# Patient Record
Sex: FEMALE | Race: WHITE | URBAN - METROPOLITAN AREA
[De-identification: names, ages, dates, MRNs, and addresses within clinical notes are randomized per-mention and may not be internally consistent; named-entity substitution may affect disease eponyms.]

---

## 2019-04-11 ENCOUNTER — HOSPITAL ENCOUNTER (EMERGENCY)
Facility: CLINIC | Age: 49
Discharge: HOME | End: 2019-04-11
Attending: FAMILY MEDICINE | Admitting: FAMILY MEDICINE
Payer: COMMERCIAL

## 2019-04-11 VITALS
HEIGHT: 72 IN | HEART RATE: 70 BPM | TEMPERATURE: 98.4 F | WEIGHT: 205 LBS | SYSTOLIC BLOOD PRESSURE: 133 MMHG | DIASTOLIC BLOOD PRESSURE: 93 MMHG | OXYGEN SATURATION: 95 % | RESPIRATION RATE: 16 BRPM | BODY MASS INDEX: 27.77 KG/M2

## 2019-04-11 DIAGNOSIS — M70.52 INFRAPATELLAR BURSITIS OF LEFT KNEE: ICD-10-CM

## 2019-04-11 PROCEDURE — 99282 EMERGENCY DEPT VISIT SF MDM: CPT | Performed by: FAMILY MEDICINE

## 2019-04-11 PROCEDURE — 99283 EMERGENCY DEPT VISIT LOW MDM: CPT | Mod: Z6 | Performed by: FAMILY MEDICINE

## 2019-04-11 ASSESSMENT — ENCOUNTER SYMPTOMS
ABDOMINAL PAIN: 0
FREQUENCY: 0
SORE THROAT: 0
DIAPHORESIS: 0
CONSTIPATION: 0
SHORTNESS OF BREATH: 0
COUGH: 0
DIARRHEA: 0
FEVER: 0
HEADACHES: 0
WHEEZING: 0
DYSURIA: 0
VOMITING: 0
BLOOD IN STOOL: 0
NAUSEA: 0
CHILLS: 0
PALPITATIONS: 0
SINUS PRESSURE: 0

## 2019-04-11 ASSESSMENT — MIFFLIN-ST. JEOR: SCORE: 1661.87

## 2019-04-11 NOTE — DISCHARGE INSTRUCTIONS
ICD-10-CM    1. Infrapatellar bursitis of left knee M70.52     pretibial bursitis vs sebaceous cyst.,  cool compress to the area 20 min/hour for the next 2-3 days. ace wrap.  return immed for signs infection.  follow-up next week. avoid kneeling or trauma to area

## 2019-04-11 NOTE — ED AVS SNAPSHOT
[facility] Emergency Department  [address] [address]  Phone:  [phone]  Fax:  [phone]                                    Mercedes Dailey   MRN: [id number]    Department:  [facility] Emergency Department   Date of Visit:  4/11/2019           After Visit Summary Signature Page    I have received my discharge instructions, and my questions have been answered. I have discussed any challenges I see with this plan with the nurse or doctor.    ..........................................................................................................................................  Patient/Patient Representative Signature      ..........................................................................................................................................  Patient Representative Print Name and Relationship to Patient    ..................................................               ................................................  Date                                   Time    ..........................................................................................................................................  Reviewed by Signature/Title    ...................................................              ..............................................  Date                                               Time          22EPIC Rev 08/18

## 2019-04-11 NOTE — ED PROVIDER NOTES
History     Chief Complaint   Patient presents with     Mass     left leg     HPI  Mercedes Dailey is a 49 year old female who presents with a history of impaired fasting glucose, hypertension, tobacco abuse with acute onset of left leg focal swelling at the tibial tuberosity region without trauma but may have been kneeling today picking up laundry.  No overuse or direct blow to the knee.  No significant pain.  No recent fever or erythema or signs of infection.    Allergies:  Allergies   Allergen Reactions     Erythromycin Other (See Comments)     Vomiting     Penicillin G Hives       Problem List:    Patient Active Problem List    Diagnosis Date Noted     Abnormal glandular Papanicolaou smear of cervix 03/22/2016     Priority: Medium     Overview:   03/22/2016; Atypical Endo Cells: Colposcopy and Endocervial sampling advised       Tobacco dependence syndrome 03/14/2014     Priority: Medium     Hypertension 01/15/2010     Priority: Medium     Impaired fasting glucose 08/21/2009     Priority: Medium     Multiple-type hyperlipidemia 08/21/2009     Priority: Medium     Hereditary and idiopathic peripheral neuropathy 08/14/2009     Priority: Medium     Major depressive episode 05/17/2008     Priority: Medium     Anxiety state 05/02/2007     Priority: Medium     Panic disorder without agoraphobia 05/02/2007     Priority: Medium     Sleep apnea 03/08/2007     Priority: Medium     Overview:   Mild          Past Medical History:    No past medical history on file.    Past Surgical History:    No past surgical history on file.    Family History:    No family history on file.    Social History:  Marital Status:   [2]  Social History     Tobacco Use     Smoking status: Current Every Day Smoker     Packs/day: 1.00   Substance Use Topics     Alcohol use: No     Drug use: No        Medications:      No current outpatient medications on file.      Review of Systems   Constitutional: Negative for chills, diaphoresis and  fever.   HENT: Negative for ear pain, sinus pressure and sore throat.    Eyes: Negative for visual disturbance.   Respiratory: Negative for cough, shortness of breath and wheezing.    Cardiovascular: Negative for chest pain and palpitations.   Gastrointestinal: Negative for abdominal pain, blood in stool, constipation, diarrhea, nausea and vomiting.   Genitourinary: Negative for dysuria, frequency and urgency.   Skin: Negative for rash.   Neurological: Negative for headaches.   All other systems reviewed and are negative.         Physical Exam   BP: (!) 158/104  Heart Rate: 74  Temp: 98.4  F (36.9  C)  Resp: 16  Height: 182.9 cm (6')  Weight: 93 kg (205 lb)  SpO2: 95 %      Physical Exam    Focal swelling at the left tibial tuberosity region without overlying erythema.  The swelling is approximately 1-2 cm in size and is nontender to touch.  No obvious bony abnormality.  Full range of motion of the knee without tenderness at the margins.  No deformity otherwise.  No tenderness to palpation over the tibia or fibula.  No overlying puncta.     ED Course        Procedures               Critical Care time:  none               No results found for this or any previous visit (from the past 24 hour(s)).    Medications - No data to display    Assessments & Plan (with Medical Decision Making)     MDM: Mercedes Dailey is a 49 year old female who presented with a focal swelling at the left knee over the tibial tuberosity region and I suspect this is due to kneeling this morning while picking up laundry.  There were no other injuries to the knee and she has no signs of infection.  I given her precautions for return for signs of infection.  We have asked her to follow-up with her primary provider if this persists next week.  I told her that I do not typically aspirate bursa sacs as the potentially can form fistulas to the skin.  The exception to this is if it becomes infected then it needs to be drained for culture and if it  turns out to be a alternative case of another lesion such as a sebaceous cyst that becomes infected and inflamed those are also opened.  However at this time symptomatic management as below.  Discussed use initially of some cool compresses and after that warmth after the first few days.  Ace wrap to the area.      We also discussed that her blood pressure was elevated today and asked her to recheck in clinic.    I have reviewed the nursing notes.    I have reviewed the findings, diagnosis, plan and need for follow up with the patient.          Medication List      There are no discharge medications for this visit.         Final diagnoses:   Infrapatellar bursitis of left knee - pretibial bursitis vs sebaceous cyst.,  cool compress to the area 20 min/hour for the next 2-3 days. ace wrap.  return immed for signs infection.  follow-up next week. avoid kneeling or trauma to area       4/11/2019   [facility] EMERGENCY DEPARTMENT     Mak Us MD  04/11/19 1758